# Patient Record
Sex: FEMALE | Race: WHITE
[De-identification: names, ages, dates, MRNs, and addresses within clinical notes are randomized per-mention and may not be internally consistent; named-entity substitution may affect disease eponyms.]

---

## 2018-09-03 ENCOUNTER — HOSPITAL ENCOUNTER (EMERGENCY)
Dept: HOSPITAL 25 - ED | Age: 33
Discharge: HOME | End: 2018-09-03
Payer: COMMERCIAL

## 2018-09-03 VITALS — DIASTOLIC BLOOD PRESSURE: 69 MMHG | SYSTOLIC BLOOD PRESSURE: 110 MMHG

## 2018-09-03 DIAGNOSIS — Z23: ICD-10-CM
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
## 2018-09-03 DIAGNOSIS — S51.011A: Primary | ICD-10-CM
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
## 2018-09-03 PROCEDURE — 90471 IMMUNIZATION ADMIN: CPT

## 2018-09-03 PROCEDURE — 12001 RPR S/N/AX/GEN/TRNK 2.5CM/<: CPT

## 2018-09-03 PROCEDURE — 99282 EMERGENCY DEPT VISIT SF MDM: CPT

## 2018-09-03 PROCEDURE — 90715 TDAP VACCINE 7 YRS/> IM: CPT

## 2018-09-03 NOTE — ED
Laceration/Wound HPI





- HPI Summary


HPI Summary: 


Patient is a 32-year-old female presenting to the ED with a 1.5 cm laceration 

to the elbow which is superficial.  She cut the elbow on metal astrid area.  

Tetanus is not up-to-date.  Denies any pain.  Bleeding is well-controlled.  She 

denies any anticoagulation medications.  She denies any numbness or tingling.  

Could Refill.  Pulses +2 bilaterally.








- History of Current Complaint


Stated Complaint: RT ELBOW LAC


Time Seen by Provider: 09/03/18 13:21


Hx Obtained From: Patient


Mechanism of Injury: Sharp/Blunt Trauma


Onset/Duration: Sudden Onset


Aggravating: Movement


Alleviating: Compression


Timing: Constant


Onset Severity: Mild


Current Severity: Mild


Pain Intensity: 1


Pain Scale Used: 0-10 Numeric


Related Hx: Dominant Hand (Right)





- Allergy/Home Medications


Allergies/Adverse Reactions: 


 Allergies











Allergy/AdvReac Type Severity Reaction Status Date / Time


 


No Known Allergies Allergy   Verified 09/03/18 13:01














PMH/Surg Hx/FS Hx/Imm Hx


Previously Healthy: Yes





- Immunization History


Hx Pertussis Vaccination: No


Immunizations Up to Date: Yes


Infectious Disease History: No


Infectious Disease History: 


   Denies: Traveled Outside the [place] in Last 30 Days





- Family History


Known Family History: Positive: Other - mother with migraines





- Social History


Occupation: Employed Full-time


Lives: With Family


Alcohol Use: Rare


Hx Substance Use: No


Substance Use Type: Reports: None


Hx Tobacco Use: No


Smoking Status (MU): Never Smoked Tobacco





Review of Systems


Constitutional: Negative


Negative: Fever, Chills, Fatigue, Skin Diaphoresis


Negative: Palpitations, Chest Pain


Negative: Shortness Of Breath, Cough


Genitourinary: Negative


Positive: no symptoms reported, see HPI


Negative: Arthralgia, Myalgia


Negative: Rash, Bruising


Neurological: Negative


Psychological: Normal


All Other Systems Reviewed And Are Negative: Yes





Physical Exam


Triage Information Reviewed: Yes


Vital Signs On Initial Exam: 


 Initial Vitals











Temp Pulse Resp BP Pulse Ox


 


 96.9 F   60   17   117/72   99 


 


 09/03/18 12:57  09/03/18 12:57  09/03/18 12:57  09/03/18 12:57  09/03/18 12:57











Vital Signs Reviewed: Yes


Appearance: Positive: Well-Appearing, Well-Nourished


Skin: Positive: Warm, Skin Color Reflects Adequate Perfusion, Other - laceration


Head/Face: Positive: Normal Head/Face Inspection


Eyes: Positive: EOMI, ANA, Conjunctiva Clear


Neck: Positive: Supple


Respiratory/Lung Sounds: Positive: Clear to Auscultation, Breath Sounds Present


Cardiovascular: Positive: RRR, Pulses are Symmetrical in both Upper and Lower 

Extremities


Musculoskeletal: Positive: Normal, Strength/ROM Intact


Neurological: Positive: Speech Normal


Psychiatric: Positive: Normal, Affect/Mood Appropriate


AVPU Assessment: Alert





Procedures





- Laceration/Wound Repair


  ** 1


Location: upper extremity


Description: Linear


Betadine Prep?: Yes


Laceration/Wound Explored: clean


Closure: Single Layer


Suture Type: Prolene


Number of Sutures: 3


Layer Closure?: No


Sterile Dressing Applied?: Yes





Diagnostics





- Vital Signs


 Vital Signs











  Temp Pulse Resp BP Pulse Ox


 


 09/03/18 15:10  98.2 F  55  17  110/69  98


 


 09/03/18 14:06  98.9 F  76  18  126/78  98


 


 09/03/18 12:57  96.9 F  60  17  117/72  99














- Laboratory


Lab Statement: Any lab studies that have been ordered have been reviewed, and 

results considered in the medical decision making process.





Laceration Repair Course/Dx





- Course


Course Of Treatment: Cleansed the wound thoroughly.  1 mL lidocaine without epi 

used as local anesthetic with good effect.  3 sutures placed using simple 

interrupted.  Patient tolerated well.  Suture removal in 7 days.





- Differential Dx


Differental Diagnoses: Laceration





- Clinical Impression


Provider Diagnoses: 


 Laceration








Discharge





- Sign-Out/Discharge


Documenting (check all that apply): Patient Departure





- Discharge Plan


Condition: Stable


Disposition: HOME


Referrals: 


Erna Hu MD [Primary Care Provider] - 


Additional Instructions: 


Suture removal in 7 days


Keep area wrapped x 1 day - then may leave open to air





- Billing Disposition and Condition


Condition: STABLE


Disposition: Home